# Patient Record
Sex: FEMALE | Employment: UNEMPLOYED | URBAN - METROPOLITAN AREA
[De-identification: names, ages, dates, MRNs, and addresses within clinical notes are randomized per-mention and may not be internally consistent; named-entity substitution may affect disease eponyms.]

---

## 2021-12-14 ENCOUNTER — TELEPHONE (OUTPATIENT)
Dept: FAMILY MEDICINE CLINIC | Age: 56
End: 2021-12-14

## 2021-12-14 NOTE — TELEPHONE ENCOUNTER
----- Message from Svetlana Zarco sent at 12/10/2021  4:12 PM EST -----  Subject: Message to Provider    QUESTIONS  Information for Provider? Danna Solorzano returned a miss call from the office   please reach back out to her.   ---------------------------------------------------------------------------  --------------  [address]  What is the best way for the office to contact you? OK to leave message on   voicemail  Preferred Call Back Phone Number? [phone]  ---------------------------------------------------------------------------  --------------  SCRIPT ANSWERS  Relationship to Patient?  Self

## 2021-12-15 ENCOUNTER — TELEPHONE (OUTPATIENT)
Dept: FAMILY MEDICINE CLINIC | Age: 56
End: 2021-12-15

## 2021-12-15 NOTE — TELEPHONE ENCOUNTER
Does not need new patient appointment at the moment. She was able to get another month worth of her prescription. She will call at a later time to set up this appointment. Asked to be directly transferred to office instead of [address] when she calls.

## 2022-01-11 ENCOUNTER — NURSE TRIAGE (OUTPATIENT)
Dept: OTHER | Facility: CLINIC | Age: 57
End: 2022-01-11

## 2022-01-11 NOTE — TELEPHONE ENCOUNTER
Received call from [facility] at [facility] with Red Flag Complaint; Patient with Red Flag Complaint requesting to establish care with [address] [place] [address]. Subjective: Caller states \"SOB\"     Current Symptoms: SOB, congestion, sinus, sore throat. Hx asthma. Onset: 3 weeks ago; gradual    Associated Symptoms: NA    Pain Severity: 8/10; aching; constant    Temperature: 98.7orally    What has been tried: Allergy med    LMP: NA Pregnant: NA    Recommended disposition: see in office today/ [facility] as back up plan    Care advice provided, patient verbalizes understanding; denies any other questions or concerns; instructed to call back for any new or worsening symptoms. Left message on [facility] chat for scheduling    Attention Provider: Thank you for allowing me to participate in the care of your patient. The patient was connected to triage in response to information provided to the [facility]. Please do not respond through this encounter as the response is not directed to a shared pool.     Reason for Disposition   Patient wants to be seen    Protocols used: SORE THROAT-ADULT-[place]

## 2023-09-01 ENCOUNTER — TRANSCRIBE ORDERS (OUTPATIENT)
Facility: HOSPITAL | Age: 58
End: 2023-09-01

## 2023-09-01 DIAGNOSIS — K86.89 PANCREATIC MASS: Primary | ICD-10-CM

## 2024-01-23 ENCOUNTER — OFFICE VISIT (OUTPATIENT)
Age: 59
End: 2024-01-23

## 2024-01-23 VITALS
SYSTOLIC BLOOD PRESSURE: 135 MMHG | HEIGHT: 60 IN | BODY MASS INDEX: 37.3 KG/M2 | DIASTOLIC BLOOD PRESSURE: 75 MMHG | TEMPERATURE: 98.6 F | HEART RATE: 63 BPM | WEIGHT: 190 LBS | OXYGEN SATURATION: 96 % | RESPIRATION RATE: 18 BRPM

## 2024-01-23 DIAGNOSIS — R05.1 ACUTE COUGH: ICD-10-CM

## 2024-01-23 DIAGNOSIS — R03.0 ELEVATED BLOOD PRESSURE READING: ICD-10-CM

## 2024-01-23 DIAGNOSIS — J01.90 ACUTE BACTERIAL SINUSITIS: Primary | ICD-10-CM

## 2024-01-23 DIAGNOSIS — B96.89 ACUTE BACTERIAL SINUSITIS: Primary | ICD-10-CM

## 2024-01-23 RX ORDER — FEXOFENADINE HCL 60 MG/1
60 TABLET, FILM COATED ORAL DAILY
COMMUNITY

## 2024-01-23 RX ORDER — FLUVOXAMINE MALEATE 150 MG/1
CAPSULE, EXTENDED RELEASE ORAL
COMMUNITY

## 2024-01-23 RX ORDER — METHYLPREDNISOLONE 4 MG/1
4 TABLET ORAL SEE ADMIN INSTRUCTIONS
Qty: 1 KIT | Refills: 0 | Status: SHIPPED | OUTPATIENT
Start: 2024-01-23

## 2024-01-23 RX ORDER — BUDESONIDE AND FORMOTEROL FUMARATE DIHYDRATE 160; 4.5 UG/1; UG/1
80 AEROSOL RESPIRATORY (INHALATION) DAILY
COMMUNITY
Start: 2024-01-04

## 2024-01-23 RX ORDER — MONTELUKAST SODIUM 10 MG/1
10 TABLET ORAL DAILY
COMMUNITY

## 2024-01-23 RX ORDER — EXEMESTANE 25 MG/1
TABLET ORAL
COMMUNITY
Start: 2023-12-27

## 2024-01-23 RX ORDER — OMEPRAZOLE 20 MG/1
20 CAPSULE, DELAYED RELEASE ORAL DAILY
COMMUNITY

## 2024-01-23 RX ORDER — BENZONATATE 100 MG/1
100 CAPSULE ORAL 3 TIMES DAILY PRN
Qty: 30 CAPSULE | Refills: 0 | Status: SHIPPED | OUTPATIENT
Start: 2024-01-23 | End: 2024-02-02

## 2024-01-23 RX ORDER — GUAIFENESIN 600 MG/1
1200 TABLET, EXTENDED RELEASE ORAL 2 TIMES DAILY
COMMUNITY

## 2024-01-23 RX ORDER — AMOXICILLIN AND CLAVULANATE POTASSIUM 875; 125 MG/1; MG/1
1 TABLET, FILM COATED ORAL 2 TIMES DAILY
Qty: 20 TABLET | Refills: 0 | Status: SHIPPED | OUTPATIENT
Start: 2024-01-23 | End: 2024-02-02

## 2024-01-23 RX ORDER — GABAPENTIN 100 MG/1
CAPSULE ORAL
COMMUNITY
Start: 2023-01-07

## 2024-01-23 RX ORDER — METOPROLOL SUCCINATE 25 MG/1
TABLET, EXTENDED RELEASE ORAL DAILY
COMMUNITY
Start: 2021-09-10

## 2024-01-23 RX ORDER — FUROSEMIDE 20 MG/1
TABLET ORAL 2 TIMES DAILY
COMMUNITY

## 2024-01-23 RX ORDER — LAMOTRIGINE 25 MG/1
TABLET ORAL
COMMUNITY
Start: 2022-12-15

## 2024-01-23 RX ORDER — POTASSIUM CHLORIDE 600 MG/1
CAPSULE, EXTENDED RELEASE ORAL
COMMUNITY
Start: 2023-02-24

## 2024-01-23 RX ORDER — VENLAFAXINE HYDROCHLORIDE 225 MG/1
TABLET, EXTENDED RELEASE ORAL
COMMUNITY
Start: 2022-12-15

## 2024-01-23 RX ORDER — FLUTICASONE FUROATE, UMECLIDINIUM BROMIDE AND VILANTEROL TRIFENATATE 200; 62.5; 25 UG/1; UG/1; UG/1
POWDER RESPIRATORY (INHALATION)
COMMUNITY
Start: 2023-02-19

## 2024-01-23 RX ORDER — LIOTHYRONINE SODIUM 5 UG/1
5 TABLET ORAL DAILY
COMMUNITY

## 2024-01-23 RX ORDER — ERGOCALCIFEROL 1.25 MG/1
CAPSULE ORAL
COMMUNITY
Start: 2023-01-20

## 2024-01-23 RX ORDER — LOSARTAN POTASSIUM 25 MG/1
TABLET ORAL
COMMUNITY
Start: 2023-08-30

## 2024-01-23 RX ORDER — LEVOTHYROXINE SODIUM 175 UG/1
175 TABLET ORAL
COMMUNITY

## 2024-01-23 RX ORDER — BUTALBITAL, ACETAMINOPHEN AND CAFFEINE 50; 325; 40 MG/1; MG/1; MG/1
CAPSULE ORAL
COMMUNITY
Start: 2023-02-10

## 2024-01-23 RX ORDER — CLONAZEPAM 1 MG/1
TABLET ORAL
COMMUNITY
Start: 2023-02-28

## 2024-01-23 RX ORDER — LAMOTRIGINE 100 MG/1
TABLET ORAL 2 TIMES DAILY
COMMUNITY

## 2024-01-23 RX ORDER — MAGNESIUM OXIDE 400 MG/1
250 TABLET ORAL DAILY
COMMUNITY

## 2024-01-23 RX ORDER — ROSUVASTATIN CALCIUM 5 MG/1
TABLET, COATED ORAL
COMMUNITY
Start: 2023-02-11

## 2024-01-23 RX ORDER — CLONIDINE HYDROCHLORIDE 0.1 MG/1
TABLET ORAL
COMMUNITY
Start: 2023-02-11

## 2024-01-23 RX ORDER — ALBUTEROL SULFATE 90 UG/1
AEROSOL, METERED RESPIRATORY (INHALATION)
COMMUNITY
Start: 2022-11-29

## 2024-01-23 RX ORDER — FERROUS SULFATE 325(65) MG
TABLET ORAL
COMMUNITY
Start: 2023-01-13

## 2024-01-23 RX ORDER — TEMAZEPAM 30 MG/1
CAPSULE ORAL
COMMUNITY

## 2024-01-23 RX ORDER — QUETIAPINE FUMARATE 100 MG/1
TABLET, FILM COATED ORAL
COMMUNITY
Start: 2022-12-15

## 2024-01-23 RX ORDER — CLONAZEPAM 0.5 MG/1
TABLET ORAL 3 TIMES DAILY
COMMUNITY

## 2024-01-23 RX ORDER — TRAZODONE HYDROCHLORIDE 100 MG/1
100 TABLET ORAL 2 TIMES DAILY
COMMUNITY

## 2024-01-23 RX ORDER — TIOTROPIUM BROMIDE INHALATION SPRAY 1.56 UG/1
2 SPRAY, METERED RESPIRATORY (INHALATION) DAILY
COMMUNITY
Start: 2023-09-14

## 2024-01-23 RX ORDER — BUTALBITAL, ACETAMINOPHEN AND CAFFEINE 50; 325; 40 MG/1; MG/1; MG/1
TABLET ORAL
COMMUNITY
Start: 2024-01-06

## 2024-01-23 RX ORDER — MELOXICAM 15 MG/1
TABLET ORAL
COMMUNITY
Start: 2023-02-11

## 2024-01-23 RX ORDER — BUDESONIDE AND FORMOTEROL FUMARATE DIHYDRATE 80; 4.5 UG/1; UG/1
2 AEROSOL RESPIRATORY (INHALATION) DAILY
COMMUNITY
Start: 2023-09-14

## 2024-01-23 RX ORDER — LAMOTRIGINE 150 MG/1
TABLET ORAL
COMMUNITY
Start: 2022-12-15

## 2024-01-23 ASSESSMENT — ENCOUNTER SYMPTOMS
RHINORRHEA: 1
COUGH: 1
ALLERGIC/IMMUNOLOGIC NEGATIVE: 1
SINUS PAIN: 1
EYES NEGATIVE: 1
SINUS PRESSURE: 1
GASTROINTESTINAL NEGATIVE: 1

## 2024-01-24 NOTE — PROGRESS NOTES
warnings with the patient as well. The patient agrees and understands above plan.       An electronic signature was used to authenticate this note.  -- Melita Reynolds MD

## 2024-11-11 ENCOUNTER — OFFICE VISIT (OUTPATIENT)
Age: 59
End: 2024-11-11

## 2024-11-11 VITALS
WEIGHT: 196.8 LBS | HEIGHT: 60 IN | TEMPERATURE: 98.6 F | RESPIRATION RATE: 18 BRPM | HEART RATE: 84 BPM | OXYGEN SATURATION: 93 % | SYSTOLIC BLOOD PRESSURE: 136 MMHG | BODY MASS INDEX: 38.64 KG/M2 | DIASTOLIC BLOOD PRESSURE: 80 MMHG

## 2024-11-11 DIAGNOSIS — H91.92 DECREASED HEARING OF LEFT EAR: Primary | ICD-10-CM

## 2024-11-11 DIAGNOSIS — J02.9 SORE THROAT: ICD-10-CM

## 2024-11-11 DIAGNOSIS — I10 ELEVATED BLOOD PRESSURE READING WITH DIAGNOSIS OF HYPERTENSION: ICD-10-CM

## 2024-11-11 NOTE — PATIENT INSTRUCTIONS
Thank you for visiting [facility] today.    Please follow up with ENT for further evaluation    Please go immediately to the ER if you develop severe hearing loss, intractable pain or uncontrollable fever above 100.4F.      Sore Throat:     - Gargle with warm salt water. This helps reduce swelling and relieve discomfort. Gargle once an hour with 1 teaspoon of salt mixed in 8 fluid ounces of warm water. Increase fluid intake. Start Saline nasal spray & sinus rinses. Take an over-the-counter pain medicine, such as acetaminophen (Tylenol), ibuprofen (Advil, Motrin),  to reduce fever and relieve body aches. Read and follow all instructions on the label.    - Wash your hands often with soap and water. It is one of the best ways to prevent the spread of infection. You can use an alcohol rub instead, but make sure that the hand rub gets everywhere on your hands.    - Follow up with PCP if worsening, fevers persist for > 5 days, severe pain with swallowing or unable to swallow (drooling), any other concerns.

## 2024-11-11 NOTE — PROGRESS NOTES
person, place, and time. Mental status is at baseline.   Psychiatric:         Mood and Affect: Mood normal.         Behavior: Behavior normal.         Judgment: Judgment normal.        Vitals:    11/11/24 1629 11/11/24 1644   BP: (!) 144/83 136/80   Site: Left Upper Arm Left Upper Arm   Position: Sitting Sitting   Cuff Size: Medium Adult Large Adult   Pulse: 84    Resp: 18    Temp: 98.6 °F (37 °C)    TempSrc: Oral    SpO2: 93%    Weight: 89.3 kg (196 lb 12.8 oz)    Height: 1.524 m (5')         No Known Allergies    Current Outpatient Medications   Medication Sig Dispense Refill    LOSARTAN POTASSIUM PO Take by mouth      albuterol sulfate HFA (PROVENTIL;VENTOLIN;PROAIR) 108 (90 Base) MCG/ACT inhaler       budesonide-formoterol (SYMBICORT) 80-4.5 MCG/ACT AERO 2 puffs daily      SYMBICORT 160-4.5 MCG/ACT AERO Inhale 800 puffs into the lungs daily      butalbital-acetaminophen-caffeine (FIORICET, ESGIC) -40 MG per tablet       butalbital-apap-caffeine -40 MG CAPS       clonazePAM (KLONOPIN) 1 MG tablet Take 1 tablet by mouth nightly as needed.      vitamin D (ERGOCALCIFEROL) 1.25 MG (03450 UT) CAPS capsule Take 2,000 Units by mouth Daily      exemestane (AROMASIN) 25 MG tablet       FEROSUL 325 (65 Fe) MG tablet       fexofenadine (ALLEGRA) 60 MG tablet Take 1 tablet by mouth daily      gabapentin (NEURONTIN) 100 MG capsule       guaiFENesin (MUCINEX) 600 MG extended release tablet Take 2 tablets by mouth 2 times daily      lamoTRIgine (LAMICTAL) 100 MG tablet Take 2 tablets by mouth every morning      levothyroxine (SYNTHROID) 175 MCG tablet Take 1 tablet by mouth      magnesium oxide (MAG-OX) 400 MG tablet Take 250 mg by mouth daily      metFORMIN (GLUCOPHAGE) 500 MG tablet Take 4 tablets by mouth daily      montelukast (SINGULAIR) 10 MG tablet Take 1 tablet by mouth daily      QUEtiapine (SEROQUEL) 100 MG tablet       rosuvastatin (CRESTOR) 5 MG tablet       SPIRIVA RESPIMAT 1.25 MCG/ACT AERS inhaler 2

## 2025-08-14 ENCOUNTER — TELEPHONE (OUTPATIENT)
Age: 60
End: 2025-08-14